# Patient Record
Sex: FEMALE | URBAN - METROPOLITAN AREA
[De-identification: names, ages, dates, MRNs, and addresses within clinical notes are randomized per-mention and may not be internally consistent; named-entity substitution may affect disease eponyms.]

---

## 2023-06-15 ENCOUNTER — OFFICE VISIT (OUTPATIENT)
Dept: URBAN - METROPOLITAN AREA CLINIC 6 | Facility: CLINIC | Age: 43
End: 2023-06-15
Payer: COMMERCIAL

## 2023-06-15 DIAGNOSIS — H31.012 MACULA SCARS OF POSTERIOR POLE (POST TRAUMATIC), LEFT EYE: ICD-10-CM

## 2023-06-15 DIAGNOSIS — H04.123 TEAR FILM INSUFFICIENCY OF BILATERAL LACRIMAL GLANDS: ICD-10-CM

## 2023-06-15 DIAGNOSIS — E11.9 DIABETES MELLITUS TYPE 2 WITHOUT MENTION OF COMPLICATION: Primary | ICD-10-CM

## 2023-06-15 PROCEDURE — 99204 OFFICE O/P NEW MOD 45 MIN: CPT | Performed by: OPTOMETRIST

## 2023-06-15 PROCEDURE — 92250 FUNDUS PHOTOGRAPHY W/I&R: CPT | Performed by: OPTOMETRIST

## 2023-06-15 ASSESSMENT — INTRAOCULAR PRESSURE
OD: 24
OS: 21

## 2023-06-15 NOTE — IMPRESSION/PLAN
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 Plan: Dense central scar in macula OS - unknown etiology. Patient reports in childhood had bleeding in the retina and no known cause. Reports getting laser but central vision never improved. Baseline photo ordered and taken today. Discussed with patient the importance of protecting the right eye and avoiding any dangerous activities which may put the eye at risk. Wear ocular protection when appropriate and contact our office immediately with any changes in vision.

## 2023-06-15 NOTE — IMPRESSION/PLAN
Impression: Diabetes mellitus Type 2 without mention of complication: X61.5. Plan: Pre- Diabetes Mellitus Type II without signs of diabetic retinopathy either eye - Discussed the pathophysiology of diabetes and its effect on the eye. Stressed the importance of strong glucose control. Advised of importance of at least annual dilated examinations, and to contact us immediately for any problems or concerns.